# Patient Record
(demographics unavailable — no encounter records)

---

## 2025-04-22 NOTE — HISTORY OF PRESENT ILLNESS
[FreeTextEntry1] : 50 y/o male here for pre-colonoscopy visit. PCP is Dr. Santiago  Pt w/ no pertinent PMH/PSH. This will be the pt's initial colonoscopy.  Pt denies abdominal pain, n/v, constipation, diarrhea, and bleeding. Pt reports daily soft BMs.    Pt educated on [facility] prep instructions. Pre/post colonoscopy instructions reviewed with patient. Pt verbalized understanding of instructions

## 2025-04-22 NOTE — PHYSICAL EXAM
[Normal Breath Sounds] : Normal breath sounds [Normal Heart Sounds] : normal heart sounds [Normal Rate and Rhythm] : normal rate and rhythm [No Rash or Lesion] : No rash or lesion [Alert] : alert [Oriented to Person] : oriented to person [Oriented to Place] : oriented to place [Oriented to Time] : oriented to time [Calm] : calm [de-identified] : soft, NT [de-identified] : NAD [de-identified] : NC/AT [de-identified] : +ROM [de-identified] : intact

## 2025-04-22 NOTE — PHYSICAL EXAM
[Normal Breath Sounds] : Normal breath sounds [Normal Heart Sounds] : normal heart sounds [Normal Rate and Rhythm] : normal rate and rhythm [No Rash or Lesion] : No rash or lesion [Alert] : alert [Oriented to Person] : oriented to person [Oriented to Place] : oriented to place [Oriented to Time] : oriented to time [Calm] : calm [de-identified] : soft, NT [de-identified] : NAD [de-identified] : NC/AT [de-identified] : +ROM [de-identified] : intact

## 2025-04-22 NOTE — ASSESSMENT
[FreeTextEntry1] : 51-year-old male for screening colonoscopy - Risks and benefits reviewed - Bowel prep given - All questions answered - Patient to proceed as scheduled